# Patient Record
Sex: MALE | Race: WHITE | NOT HISPANIC OR LATINO
[De-identification: names, ages, dates, MRNs, and addresses within clinical notes are randomized per-mention and may not be internally consistent; named-entity substitution may affect disease eponyms.]

---

## 2019-07-11 ENCOUNTER — TRANSCRIPTION ENCOUNTER (OUTPATIENT)
Age: 49
End: 2019-07-11

## 2020-07-12 ENCOUNTER — EMERGENCY (EMERGENCY)
Facility: HOSPITAL | Age: 50
LOS: 1 days | Discharge: HOME | End: 2020-07-12
Attending: EMERGENCY MEDICINE
Payer: COMMERCIAL

## 2020-07-12 VITALS
HEIGHT: 72 IN | RESPIRATION RATE: 19 BRPM | TEMPERATURE: 98 F | DIASTOLIC BLOOD PRESSURE: 65 MMHG | SYSTOLIC BLOOD PRESSURE: 117 MMHG | OXYGEN SATURATION: 97 % | HEART RATE: 70 BPM | WEIGHT: 184.97 LBS

## 2020-07-12 DIAGNOSIS — Q82.5 CONGENITAL NON-NEOPLASTIC NEVUS: Chronic | ICD-10-CM

## 2020-07-12 DIAGNOSIS — Z98.89 OTHER SPECIFIED POSTPROCEDURAL STATES: Chronic | ICD-10-CM

## 2020-07-12 PROCEDURE — 99283 EMERGENCY DEPT VISIT LOW MDM: CPT | Mod: 25

## 2020-07-12 PROCEDURE — U0003: CPT

## 2020-07-12 PROCEDURE — 71045 X-RAY EXAM CHEST 1 VIEW: CPT

## 2020-07-12 PROCEDURE — 99284 EMERGENCY DEPT VISIT MOD MDM: CPT

## 2020-07-12 PROCEDURE — 71045 X-RAY EXAM CHEST 1 VIEW: CPT | Mod: 26

## 2020-07-12 NOTE — ED STATDOCS - PSH
Birthmark of skin  approx. 1978 - Excision of birthmark from navel area  S/P arthroscopy of shoulder  Oct 2013 - Left shoulder  S/P colonoscopy with polypectomy  2012

## 2020-07-12 NOTE — ED STATDOCS - PATIENT PORTAL LINK FT
You can access the FollowMyHealth Patient Portal offered by [facility] by registering at the following website: [email or web address]. By joining [employer]’s FollowMyHealth portal, you will also be able to view your health information using other applications (apps) compatible with our system.

## 2020-07-12 NOTE — ED STATDOCS - ATTENDING CONTRIBUTION TO CARE
I, Pierre Barba, performed the initial face to face bedside interview with this patient regarding history of present illness, review of symptoms and relevant past medical, social and family history.  I completed an independent physical examination.  I was the initial provider who evaluated this patient. I have signed out the follow up of any pending tests (i.e. labs, radiological studies) to the ACP.  I have communicated the patient’s plan of care and disposition with the ACP.  The history, relevant review of systems, past medical and surgical history, medical decision making, and physical examination was documented by the scribe in my presence and I attest to the accuracy of the documentation. I, Pierre Barba, performed the initial face to face bedside interview with this patient regarding history of present illness, review of symptoms and relevant past medical, social and family history.  I completed an independent physical examination.  I was the initial provider who evaluated this patient. I have signed out the follow up of any pending tests (i.e. labs, radiological studies) to the ACP.  I have communicated the patient’s plan of care and disposition with the ACP.  The history, relevant review of systems, past medical and surgical history, medical decision making, and physical examination was documented by the scribe in my presence and I attest to the accuracy of the documentation..

## 2020-07-12 NOTE — ED ADULT TRIAGE NOTE - CHIEF COMPLAINT QUOTE
Patient presented to ed secondary to sick contact with covid 19 patient with symptoms of running nose headache sore throat and dry cough started few days ago.

## 2020-07-12 NOTE — ED ADULT NURSE NOTE - NSIMPLEMENTINTERV_GEN_ALL_ED
Implemented All Universal Safety Interventions:  [place] to call system. Call bell, personal items and telephone within reach. Instruct patient to call for assistance. Room bathroom lighting operational. Non-slip footwear when patient is off stretcher. Physically safe environment: no spills, clutter or unnecessary equipment. Stretcher in lowest position, wheels locked, appropriate side rails in place.

## 2020-07-12 NOTE — ED STATDOCS - OBJECTIVE STATEMENT
51y/o M with no significant PMHx presents to the ED presents to the ED c/o mild cough with nasal congestion. Pt has contact with a friend who had parents that were tested positive for COVID. Denies SOB or CP.

## 2020-07-12 NOTE — ED ADULT NURSE NOTE - CHIEF COMPLAINT QUOTE
Patient presented to ed secondary to sick contact with covid 19 patient with symptoms of running nose headache sore throat and dry cough started few days ago. decreased strength/pain

## 2020-07-13 LAB — SARS-COV-2 RNA SPEC QL NAA+PROBE: SIGNIFICANT CHANGE UP

## 2020-12-18 NOTE — ED STATDOCS - CHPI ED RELIEVING FACTORS
[facility] calling to advise the death certificate has been faxed on 12/18 and please sign asap. nothing

## 2020-12-24 ENCOUNTER — EMERGENCY (EMERGENCY)
Facility: HOSPITAL | Age: 50
LOS: 1 days | Discharge: HOME | End: 2020-12-24
Payer: COMMERCIAL

## 2020-12-24 VITALS
DIASTOLIC BLOOD PRESSURE: 73 MMHG | OXYGEN SATURATION: 98 % | SYSTOLIC BLOOD PRESSURE: 125 MMHG | HEART RATE: 62 BPM | RESPIRATION RATE: 18 BRPM | WEIGHT: 175.05 LBS | TEMPERATURE: 98 F | HEIGHT: 72 IN

## 2020-12-24 DIAGNOSIS — Q82.5 CONGENITAL NON-NEOPLASTIC NEVUS: Chronic | ICD-10-CM

## 2020-12-24 DIAGNOSIS — Z98.89 OTHER SPECIFIED POSTPROCEDURAL STATES: Chronic | ICD-10-CM

## 2020-12-24 LAB — SARS-COV-2 RNA SPEC QL NAA+PROBE: SIGNIFICANT CHANGE UP

## 2020-12-24 PROCEDURE — 99283 EMERGENCY DEPT VISIT LOW MDM: CPT

## 2020-12-24 PROCEDURE — U0003: CPT

## 2020-12-24 NOTE — ED PROVIDER NOTE - PATIENT PORTAL LINK FT
You can access the FollowMyHealth Patient Portal offered by [facility] by registering at the following website: [email or web address]. By joining [employer]’s FollowMyHealth portal, you will also be able to view your health information using other applications (apps) compatible with our system.

## 2020-12-24 NOTE — ED PROVIDER NOTE - NSTIMEPROVIDERCAREINITIATE_GEN_ER
24-Dec-2020 12:12
I have personally seen and examined this patient.  I have fully participated in the care of this patient. I have reviewed all pertinent clinical information, including history, physical exam, plan and the Resident’s note and agree except as noted.

## 2020-12-31 ENCOUNTER — EMERGENCY (EMERGENCY)
Facility: HOSPITAL | Age: 50
LOS: 1 days | Discharge: HOME | End: 2020-12-31
Payer: COMMERCIAL

## 2020-12-31 VITALS
TEMPERATURE: 98 F | HEIGHT: 72 IN | SYSTOLIC BLOOD PRESSURE: 146 MMHG | RESPIRATION RATE: 20 BRPM | WEIGHT: 190.04 LBS | OXYGEN SATURATION: 98 % | HEART RATE: 91 BPM | DIASTOLIC BLOOD PRESSURE: 86 MMHG

## 2020-12-31 DIAGNOSIS — Q82.5 CONGENITAL NON-NEOPLASTIC NEVUS: Chronic | ICD-10-CM

## 2020-12-31 DIAGNOSIS — Z98.89 OTHER SPECIFIED POSTPROCEDURAL STATES: Chronic | ICD-10-CM

## 2020-12-31 LAB — SARS-COV-2 RNA SPEC QL NAA+PROBE: SIGNIFICANT CHANGE UP

## 2020-12-31 PROCEDURE — 99283 EMERGENCY DEPT VISIT LOW MDM: CPT

## 2020-12-31 PROCEDURE — U0003: CPT

## 2020-12-31 PROCEDURE — 99282 EMERGENCY DEPT VISIT SF MDM: CPT

## 2020-12-31 NOTE — ED PROVIDER NOTE - PATIENT PORTAL LINK FT
You can access the FollowMyHealth Patient Portal offered by [facility] by registering at the following website: [email or web address]. By joining [employer]’s FollowMyHealth portal, you will also be able to view your health information using other applications (apps) compatible with our system.

## 2020-12-31 NOTE — ED PROVIDER NOTE - PHYSICAL EXAMINATION
Constitutional - well-developed; well nourished. Head - NCAT. Airway patent. Neuro - A&Ox3. normal gait. Skin - No rash. MSK - normal ROM.

## 2021-01-04 ENCOUNTER — EMERGENCY (EMERGENCY)
Facility: HOSPITAL | Age: 51
LOS: 1 days | Discharge: HOME | End: 2021-01-04
Payer: COMMERCIAL

## 2021-01-04 VITALS
SYSTOLIC BLOOD PRESSURE: 160 MMHG | DIASTOLIC BLOOD PRESSURE: 93 MMHG | HEIGHT: 72 IN | HEART RATE: 100 BPM | RESPIRATION RATE: 16 BRPM | TEMPERATURE: 98 F | OXYGEN SATURATION: 99 %

## 2021-01-04 DIAGNOSIS — Q82.5 CONGENITAL NON-NEOPLASTIC NEVUS: Chronic | ICD-10-CM

## 2021-01-04 DIAGNOSIS — Z98.89 OTHER SPECIFIED POSTPROCEDURAL STATES: Chronic | ICD-10-CM

## 2021-01-04 LAB — SARS-COV-2 RNA SPEC QL NAA+PROBE: SIGNIFICANT CHANGE UP

## 2021-01-04 PROCEDURE — U0003: CPT

## 2021-01-04 PROCEDURE — 99283 EMERGENCY DEPT VISIT LOW MDM: CPT

## 2021-01-04 PROCEDURE — U0005: CPT

## 2021-01-04 NOTE — ED PROVIDER NOTE - PATIENT PORTAL LINK FT
You can access the FollowMyHealth Patient Portal offered by [facility] by registering at the following website: [email or web address]. By joining [employer]’s FollowMyHealth portal, you will also be able to view your health information using other applications (apps) compatible with our system.

## 2021-01-04 NOTE — ED PROVIDER NOTE - OBJECTIVE STATEMENT
49yo M presents for covid-19 testing. Pt's wife tested positive on Saturday, 3 days ago. No symptoms at this time, feeling well. Denies fevers chills, loss of taste or smell, URI symptoms, chest pain or shortness of breath, nausea vomiting diarrhea abdominal pain, weakness or fatigue. Eating and drinking normal diet. Normal output.

## 2021-01-04 NOTE — ED PROVIDER NOTE - CLINICAL SUMMARY MEDICAL DECISION MAKING FREE TEXT BOX
Pt presenting for asymptomatic swab. COVID (and/or RVP) swab(s) obtained.  Advised home quarantine until test results available.  If test positive, requires home quarantine. Anticipatory guidance provided and supportive care recommended. Advised immediate return if worsening symptoms, including and not limited to chest pain, worsening shortness of breath, fever not reduced with OTC antipyretic use, inability to tolerate food or liquid. Patient provided copy of written covid discharge instructions as well as post discharge instructions on how to obtain their covid swab results.

## 2021-01-04 NOTE — ED PROVIDER NOTE - PHYSICAL EXAMINATION
Gen: WD/WN, NAD, non-toxic  HENT: NCAT  Cardiac: Well perfused  Resp: No resp distress  Skin: Warm, dry, no rashes or lesions  Neuro: Awake, alert & oriented x 3.

## 2021-01-04 NOTE — ED PROVIDER NOTE - NS ED ROS FT
Gen: denies fever, chills, fatigue, weight loss  Skin: denies rashes, laceration, bruising  HEENT: denies visual changes, ear pain, nasal congestion, throat pain  Respiratory: denies BRUSH, SOB, cough, wheezing  Cardiovascular: denies chest pain, palpitations, diaphoresis, LE edema  GI: denies abdominal pain, n/v/d

## 2021-01-04 NOTE — ED PROVIDER NOTE - NSFOLLOWUPINSTRUCTIONS_ED_ALL_ED_FT
Patient provided copy of written covid discharge instructions as well as post discharge instructions on how to obtain their covid swab results.

## 2021-12-13 ENCOUNTER — EMERGENCY (EMERGENCY)
Facility: HOSPITAL | Age: 51
LOS: 1 days | Discharge: HOME | End: 2021-12-13
Attending: EMERGENCY MEDICINE
Payer: COMMERCIAL

## 2021-12-13 VITALS
HEIGHT: 72 IN | WEIGHT: 199.96 LBS | RESPIRATION RATE: 20 BRPM | DIASTOLIC BLOOD PRESSURE: 93 MMHG | SYSTOLIC BLOOD PRESSURE: 132 MMHG | OXYGEN SATURATION: 97 % | TEMPERATURE: 98 F | HEART RATE: 63 BPM

## 2021-12-13 DIAGNOSIS — Z98.89 OTHER SPECIFIED POSTPROCEDURAL STATES: Chronic | ICD-10-CM

## 2021-12-13 DIAGNOSIS — Q82.5 CONGENITAL NON-NEOPLASTIC NEVUS: Chronic | ICD-10-CM

## 2021-12-13 LAB
HMPV RNA SPEC QL NAA+PROBE: DETECTED
RAPID RVP RESULT: DETECTED
SARS-COV-2 RNA SPEC QL NAA+PROBE: SIGNIFICANT CHANGE UP

## 2021-12-13 PROCEDURE — 0225U NFCT DS DNA&RNA 21 SARSCOV2: CPT

## 2021-12-13 PROCEDURE — 71046 X-RAY EXAM CHEST 2 VIEWS: CPT

## 2021-12-13 PROCEDURE — 71046 X-RAY EXAM CHEST 2 VIEWS: CPT | Mod: 26

## 2021-12-13 PROCEDURE — 99284 EMERGENCY DEPT VISIT MOD MDM: CPT

## 2021-12-13 PROCEDURE — 99283 EMERGENCY DEPT VISIT LOW MDM: CPT | Mod: 25

## 2021-12-13 NOTE — ED PROVIDER NOTE - NSTIMEPROVIDERCAREINITIATE_GEN_ER
13-Dec-2021 09:23 [Time Spent: ___ minutes] : I have spent [unfilled] minutes of time on the encounter. [>50% of the face to face encounter time was spent on counseling and/or coordination of care for ___] : Greater than 50% of the face to face encounter time was spent on counseling and/or coordination of care for [unfilled]

## 2021-12-13 NOTE — ED PROVIDER NOTE - OBJECTIVE STATEMENT
50yo male with no PMH presenting with nonproductive cough  and body aches x 2 days, no fevers, chills, n/v/d. Patient received covid vaccine, was scheduled for booster today but did not go because he was having symptoms. Denies sick contacts.

## 2021-12-13 NOTE — ED PROVIDER NOTE - ATTENDING CONTRIBUTION TO CARE
I, Gena Olivares, performed a face to face bedside interview with this patient regarding history of present illness, review of symptoms and relevant past medical, social and family history.  I completed an independent physical examination. Medical decision making, follow-up on ordered tests (ie labs, radiologic studies) and re-evaluation of the patient's status has been communicated to the ACP.  Disposition of the patient will be based on test outcome and response to ED interventions.

## 2021-12-13 NOTE — ED PROVIDER NOTE - PHYSICAL EXAMINATION
Gen: NAD, AOx3  Head: NCAT  HEENT: oral mucosa moist, normal conjunctiva  Lung: CTAB, no respiratory distress, no wheezing, rales, rhonchi  CV: normal s1/s2, rrr, no murmurs, Normal perfusion  Abd: soft, NTND  MSK: No edema, no visible deformities, full range of motion in all 4 extremities  Neuro: No focal neurologic deficits  Skin: No rash   Psych: normal affect

## 2021-12-13 NOTE — ED PROVIDER NOTE - NSICDXPASTSURGICALHX_GEN_ALL_CORE_FT
PAST SURGICAL HISTORY:  Birthmark of skin approx. 1978 - Excision of birthmark from navel area    S/P arthroscopy of shoulder Oct 2013 - Left shoulder    S/P colonoscopy with polypectomy 2012

## 2021-12-13 NOTE — ED ADULT NURSE NOTE - NSIMPLEMENTINTERV_GEN_ALL_ED
Implemented All Universal Safety Interventions:  [place] to call system. Call bell, personal items and telephone within reach. Instruct patient to call for assistance. Room bathroom lighting operational. Non-slip footwear when patient is off stretcher. Physically safe environment: no spills, clutter or unnecessary equipment. Stretcher in lowest position, wheels locked, appropriate side rails in place.

## 2023-02-13 ENCOUNTER — OFFICE (OUTPATIENT)
Dept: URBAN - METROPOLITAN AREA CLINIC 12 | Facility: CLINIC | Age: 53
Setting detail: OPHTHALMOLOGY
End: 2023-02-13
Payer: COMMERCIAL

## 2023-02-13 DIAGNOSIS — H43.391: ICD-10-CM

## 2023-02-13 DIAGNOSIS — H52.4: ICD-10-CM

## 2023-02-13 PROCEDURE — 92014 COMPRE OPH EXAM EST PT 1/>: CPT | Performed by: OPHTHALMOLOGY

## 2023-02-13 PROCEDURE — 92015 DETERMINE REFRACTIVE STATE: CPT | Performed by: OPHTHALMOLOGY

## 2023-02-13 ASSESSMENT — VISUAL ACUITY
OS_BCVA: 20/25
OD_BCVA: 20/30

## 2023-02-13 ASSESSMENT — REFRACTION_MANIFEST
OD_CYLINDER: -0.75
OS_VA1: 20/20
OD_CYLINDER: SPHERE
OS_ADD: +1.50
OD_VA1: 20/20
OS_CYLINDER: SPHERE
OD_ADD: +1.50
OS_AXIS: 175
OS_SPHERE: +1.50
OD_SPHERE: +0.25
OS_CYLINDER: -1.25
OD_AXIS: 10
OD_SPHERE: +2.00
OS_SPHERE: +1.50

## 2023-02-13 ASSESSMENT — KERATOMETRY
OS_K1POWER_DIOPTERS: 44.50
OD_K1POWER_DIOPTERS: 42.50
OS_K2POWER_DIOPTERS: 45.00
OD_K2POWER_DIOPTERS: 42.75
OS_AXISANGLE_DEGREES: 028
OD_AXISANGLE_DEGREES: 050

## 2023-02-13 ASSESSMENT — CONFRONTATIONAL VISUAL FIELD TEST (CVF)
OS_FINDINGS: FULL
OD_FINDINGS: FULL

## 2023-02-13 ASSESSMENT — SPHEQUIV_DERIVED
OS_SPHEQUIV: -0.5
OS_SPHEQUIV: 0.875
OD_SPHEQUIV: -0.125
OD_SPHEQUIV: 0.875

## 2023-02-13 ASSESSMENT — REFRACTION_CURRENTRX
OD_SPHERE: +0.25
OS_AXIS: 168
OD_CYLINDER: -1.00
OD_ADD: +1.00
OS_CYLINDER: -1.25
OD_AXIS: 19
OS_SPHERE: +0.25
OD_OVR_VA: 20/
OS_VPRISM_DIRECTION: PROGS
OS_ADD: +1.00
OD_VPRISM_DIRECTION: PROGS
OS_OVR_VA: 20/

## 2023-02-13 ASSESSMENT — AXIALLENGTH_DERIVED
OD_AL: 23.9679
OS_AL: 23.3305
OD_AL: 23.5724
OS_AL: 22.8181

## 2023-02-13 ASSESSMENT — REFRACTION_AUTOREFRACTION
OD_AXIS: 036
OS_SPHERE: -0.25
OS_CYLINDER: -0.50
OD_CYLINDER: -0.25
OS_AXIS: 158
OD_SPHERE: +1.00

## 2023-02-13 ASSESSMENT — TONOMETRY
OS_IOP_MMHG: 15
OD_IOP_MMHG: 10

## 2023-05-05 ENCOUNTER — EMERGENCY (EMERGENCY)
Facility: HOSPITAL | Age: 53
LOS: 1 days | Discharge: HOME | End: 2023-05-05
Attending: EMERGENCY MEDICINE | Admitting: STUDENT IN AN ORGANIZED HEALTH CARE EDUCATION/TRAINING PROGRAM
Payer: COMMERCIAL

## 2023-05-05 VITALS
DIASTOLIC BLOOD PRESSURE: 80 MMHG | TEMPERATURE: 98 F | SYSTOLIC BLOOD PRESSURE: 126 MMHG | HEART RATE: 64 BPM | RESPIRATION RATE: 13 BRPM | OXYGEN SATURATION: 96 %

## 2023-05-05 VITALS
OXYGEN SATURATION: 96 % | TEMPERATURE: 97 F | SYSTOLIC BLOOD PRESSURE: 143 MMHG | RESPIRATION RATE: 20 BRPM | DIASTOLIC BLOOD PRESSURE: 93 MMHG | HEART RATE: 97 BPM

## 2023-05-05 DIAGNOSIS — Q82.5 CONGENITAL NON-NEOPLASTIC NEVUS: Chronic | ICD-10-CM

## 2023-05-05 DIAGNOSIS — I48.91 UNSPECIFIED ATRIAL FIBRILLATION: ICD-10-CM

## 2023-05-05 DIAGNOSIS — Z98.89 OTHER SPECIFIED POSTPROCEDURAL STATES: Chronic | ICD-10-CM

## 2023-05-05 LAB
A1C WITH ESTIMATED AVERAGE GLUCOSE RESULT: 5.4 % — SIGNIFICANT CHANGE UP (ref 4–5.6)
ALBUMIN SERPL ELPH-MCNC: 4.4 G/DL — SIGNIFICANT CHANGE UP (ref 3.3–5.2)
ALP SERPL-CCNC: 73 U/L — SIGNIFICANT CHANGE UP (ref 40–120)
ALT FLD-CCNC: 31 U/L — SIGNIFICANT CHANGE UP
ANION GAP SERPL CALC-SCNC: 13 MMOL/L — SIGNIFICANT CHANGE UP (ref 5–17)
APTT BLD: 26.4 SEC — LOW (ref 27.5–35.5)
AST SERPL-CCNC: 26 U/L — SIGNIFICANT CHANGE UP
BASOPHILS # BLD AUTO: 0.07 K/UL — SIGNIFICANT CHANGE UP (ref 0–0.2)
BASOPHILS NFR BLD AUTO: 0.9 % — SIGNIFICANT CHANGE UP (ref 0–2)
BILIRUB SERPL-MCNC: <0.2 MG/DL — LOW (ref 0.4–2)
BUN SERPL-MCNC: 24.2 MG/DL — HIGH (ref 8–20)
CALCIUM SERPL-MCNC: 9.2 MG/DL — SIGNIFICANT CHANGE UP (ref 8.4–10.5)
CHLORIDE SERPL-SCNC: 104 MMOL/L — SIGNIFICANT CHANGE UP (ref 96–108)
CHOLEST SERPL-MCNC: 197 MG/DL — SIGNIFICANT CHANGE UP
CO2 SERPL-SCNC: 23 MMOL/L — SIGNIFICANT CHANGE UP (ref 22–29)
CREAT SERPL-MCNC: 0.71 MG/DL — SIGNIFICANT CHANGE UP (ref 0.5–1.3)
D DIMER BLD IA.RAPID-MCNC: <150 NG/ML DDU — SIGNIFICANT CHANGE UP
EGFR: 108 ML/MIN/1.73M2 — SIGNIFICANT CHANGE UP
EOSINOPHIL # BLD AUTO: 0.44 K/UL — SIGNIFICANT CHANGE UP (ref 0–0.5)
EOSINOPHIL NFR BLD AUTO: 5.4 % — SIGNIFICANT CHANGE UP (ref 0–6)
ESTIMATED AVERAGE GLUCOSE: 108 MG/DL — SIGNIFICANT CHANGE UP (ref 68–114)
GLUCOSE SERPL-MCNC: 97 MG/DL — SIGNIFICANT CHANGE UP (ref 70–99)
HCT VFR BLD CALC: 43.8 % — SIGNIFICANT CHANGE UP (ref 39–50)
HDLC SERPL-MCNC: 64 MG/DL — SIGNIFICANT CHANGE UP
HGB BLD-MCNC: 14.7 G/DL — SIGNIFICANT CHANGE UP (ref 13–17)
IMM GRANULOCYTES NFR BLD AUTO: 1.1 % — HIGH (ref 0–0.9)
INR BLD: 0.91 RATIO — SIGNIFICANT CHANGE UP (ref 0.88–1.16)
LIPID PNL WITH DIRECT LDL SERPL: 120 MG/DL — HIGH
LYMPHOCYTES # BLD AUTO: 2.81 K/UL — SIGNIFICANT CHANGE UP (ref 1–3.3)
LYMPHOCYTES # BLD AUTO: 34.2 % — SIGNIFICANT CHANGE UP (ref 13–44)
MAGNESIUM SERPL-MCNC: 2.3 MG/DL — SIGNIFICANT CHANGE UP (ref 1.6–2.6)
MCHC RBC-ENTMCNC: 27 PG — SIGNIFICANT CHANGE UP (ref 27–34)
MCHC RBC-ENTMCNC: 33.6 GM/DL — SIGNIFICANT CHANGE UP (ref 32–36)
MCV RBC AUTO: 80.5 FL — SIGNIFICANT CHANGE UP (ref 80–100)
MONOCYTES # BLD AUTO: 0.7 K/UL — SIGNIFICANT CHANGE UP (ref 0–0.9)
MONOCYTES NFR BLD AUTO: 8.5 % — SIGNIFICANT CHANGE UP (ref 2–14)
NEUTROPHILS # BLD AUTO: 4.1 K/UL — SIGNIFICANT CHANGE UP (ref 1.8–7.4)
NEUTROPHILS NFR BLD AUTO: 49.9 % — SIGNIFICANT CHANGE UP (ref 43–77)
NON HDL CHOLESTEROL: 133 MG/DL — HIGH
NT-PROBNP SERPL-SCNC: 54 PG/ML — SIGNIFICANT CHANGE UP (ref 0–300)
PLATELET # BLD AUTO: 284 K/UL — SIGNIFICANT CHANGE UP (ref 150–400)
POTASSIUM SERPL-MCNC: 4.2 MMOL/L — SIGNIFICANT CHANGE UP (ref 3.5–5.3)
POTASSIUM SERPL-SCNC: 4.2 MMOL/L — SIGNIFICANT CHANGE UP (ref 3.5–5.3)
PROT SERPL-MCNC: 6.7 G/DL — SIGNIFICANT CHANGE UP (ref 6.6–8.7)
PROTHROM AB SERPL-ACNC: 10.6 SEC — SIGNIFICANT CHANGE UP (ref 10.5–13.4)
RBC # BLD: 5.44 M/UL — SIGNIFICANT CHANGE UP (ref 4.2–5.8)
RBC # FLD: 14.1 % — SIGNIFICANT CHANGE UP (ref 10.3–14.5)
SODIUM SERPL-SCNC: 140 MMOL/L — SIGNIFICANT CHANGE UP (ref 135–145)
T4 AB SER-ACNC: 5.8 UG/DL — SIGNIFICANT CHANGE UP (ref 4.5–12)
TRIGL SERPL-MCNC: 64 MG/DL — SIGNIFICANT CHANGE UP
TROPONIN T SERPL-MCNC: <0.01 NG/ML — SIGNIFICANT CHANGE UP (ref 0–0.06)
TROPONIN T SERPL-MCNC: <0.01 NG/ML — SIGNIFICANT CHANGE UP (ref 0–0.06)
TSH SERPL-MCNC: 4.5 UIU/ML — HIGH (ref 0.27–4.2)
WBC # BLD: 8.21 K/UL — SIGNIFICANT CHANGE UP (ref 3.8–10.5)
WBC # FLD AUTO: 8.21 K/UL — SIGNIFICANT CHANGE UP (ref 3.8–10.5)

## 2023-05-05 PROCEDURE — 84436 ASSAY OF TOTAL THYROXINE: CPT

## 2023-05-05 PROCEDURE — 99285 EMERGENCY DEPT VISIT HI MDM: CPT | Mod: 25

## 2023-05-05 PROCEDURE — 93010 ELECTROCARDIOGRAM REPORT: CPT | Mod: 76

## 2023-05-05 PROCEDURE — 84484 ASSAY OF TROPONIN QUANT: CPT

## 2023-05-05 PROCEDURE — 99222 1ST HOSP IP/OBS MODERATE 55: CPT

## 2023-05-05 PROCEDURE — 96374 THER/PROPH/DIAG INJ IV PUSH: CPT | Mod: XU

## 2023-05-05 PROCEDURE — 93005 ELECTROCARDIOGRAM TRACING: CPT

## 2023-05-05 PROCEDURE — G0378: CPT

## 2023-05-05 PROCEDURE — 85025 COMPLETE CBC W/AUTO DIFF WBC: CPT

## 2023-05-05 PROCEDURE — 99236 HOSP IP/OBS SAME DATE HI 85: CPT

## 2023-05-05 PROCEDURE — 71045 X-RAY EXAM CHEST 1 VIEW: CPT | Mod: 26

## 2023-05-05 PROCEDURE — 85379 FIBRIN DEGRADATION QUANT: CPT

## 2023-05-05 PROCEDURE — 80061 LIPID PANEL: CPT

## 2023-05-05 PROCEDURE — 85730 THROMBOPLASTIN TIME PARTIAL: CPT

## 2023-05-05 PROCEDURE — 85610 PROTHROMBIN TIME: CPT

## 2023-05-05 PROCEDURE — 83880 ASSAY OF NATRIURETIC PEPTIDE: CPT

## 2023-05-05 PROCEDURE — 83036 HEMOGLOBIN GLYCOSYLATED A1C: CPT

## 2023-05-05 PROCEDURE — C8929: CPT

## 2023-05-05 PROCEDURE — 80053 COMPREHEN METABOLIC PANEL: CPT

## 2023-05-05 PROCEDURE — 83735 ASSAY OF MAGNESIUM: CPT

## 2023-05-05 PROCEDURE — 71045 X-RAY EXAM CHEST 1 VIEW: CPT

## 2023-05-05 PROCEDURE — 99284 EMERGENCY DEPT VISIT MOD MDM: CPT

## 2023-05-05 PROCEDURE — 84443 ASSAY THYROID STIM HORMONE: CPT

## 2023-05-05 PROCEDURE — 36415 COLL VENOUS BLD VENIPUNCTURE: CPT

## 2023-05-05 RX ORDER — METOPROLOL TARTRATE 50 MG
25 TABLET ORAL ONCE
Refills: 0 | Status: COMPLETED | OUTPATIENT
Start: 2023-05-05 | End: 2023-05-05

## 2023-05-05 RX ORDER — FLECAINIDE ACETATE 50 MG
2 TABLET ORAL
Qty: 30 | Refills: 0
Start: 2023-05-05 | End: 2023-05-19

## 2023-05-05 RX ORDER — FLECAINIDE ACETATE 50 MG
300 TABLET ORAL ONCE
Refills: 0 | Status: COMPLETED | OUTPATIENT
Start: 2023-05-05 | End: 2023-05-05

## 2023-05-05 RX ORDER — METOPROLOL TARTRATE 50 MG
25 TABLET ORAL EVERY 8 HOURS
Refills: 0 | Status: DISCONTINUED | OUTPATIENT
Start: 2023-05-05 | End: 2023-05-12

## 2023-05-05 RX ORDER — ENOXAPARIN SODIUM 100 MG/ML
95 INJECTION SUBCUTANEOUS ONCE
Refills: 0 | Status: COMPLETED | OUTPATIENT
Start: 2023-05-05 | End: 2023-05-05

## 2023-05-05 RX ORDER — SODIUM CHLORIDE 9 MG/ML
1000 INJECTION INTRAMUSCULAR; INTRAVENOUS; SUBCUTANEOUS ONCE
Refills: 0 | Status: COMPLETED | OUTPATIENT
Start: 2023-05-05 | End: 2023-05-05

## 2023-05-05 RX ORDER — METOPROLOL TARTRATE 50 MG
5 TABLET ORAL ONCE
Refills: 0 | Status: COMPLETED | OUTPATIENT
Start: 2023-05-05 | End: 2023-05-05

## 2023-05-05 RX ORDER — ENOXAPARIN SODIUM 100 MG/ML
80 INJECTION SUBCUTANEOUS ONCE
Refills: 0 | Status: DISCONTINUED | OUTPATIENT
Start: 2023-05-05 | End: 2023-05-05

## 2023-05-05 RX ORDER — APIXABAN 2.5 MG/1
1 TABLET, FILM COATED ORAL
Qty: 60 | Refills: 0
Start: 2023-05-05 | End: 2023-06-03

## 2023-05-05 RX ORDER — METOPROLOL TARTRATE 50 MG
1 TABLET ORAL
Qty: 30 | Refills: 0
Start: 2023-05-05 | End: 2023-06-03

## 2023-05-05 RX ADMIN — Medication 25 MILLIGRAM(S): at 04:48

## 2023-05-05 RX ADMIN — Medication 5 MILLIGRAM(S): at 04:06

## 2023-05-05 RX ADMIN — Medication 25 MILLIGRAM(S): at 13:14

## 2023-05-05 RX ADMIN — ENOXAPARIN SODIUM 95 MILLIGRAM(S): 100 INJECTION SUBCUTANEOUS at 11:04

## 2023-05-05 RX ADMIN — Medication 300 MILLIGRAM(S): at 13:15

## 2023-05-05 RX ADMIN — SODIUM CHLORIDE 1000 MILLILITER(S): 9 INJECTION INTRAMUSCULAR; INTRAVENOUS; SUBCUTANEOUS at 02:58

## 2023-05-05 NOTE — ED CDU PROVIDER INITIAL DAY NOTE - RATE
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  Briefly, he is a 10-year-old male with a history of mild developmental delay, morbid obesity with a BMI of 40 and asthma on medications.  He was brought to medical attention recently because of his prolonged history of headaches and has anywhere from 2-3 to 15 a day.  These headaches can be short-lived, lasting seconds to minutes, or the entirety of the day.  These headaches can be precipitated and are exacerbated by episodes of coughing or sneezing.  He has taken over-the-counter medications including ibuprofen, Tylenol and a nonspecified migraine medication, which have all provided no relief for him.  His mother who is present in the exam room with him elaborates saying that he has had these headaches since he was approximately 2 years old.  Other symptoms that they have noticed he has had no difficulties with swallowing, but does have difficulties snoring and has an artificial diagnosis of sleep apnea that was made several years ago; however, he apparently does not qualify for a CPAP machine.  He also has had a longstanding history of complaints of bilateral shoulder and arm pain that are almost daily and are not precipitated by activity and seem to emanate from his neck.  He also has a longstanding history of clumsiness and numbness and tingling in his bilateral hands and feet.  He has an MRI that was performed at an outside hospital and he is here to discuss those results.      PAST MEDICAL HISTORY:     1.  Asthma.   2.  Seasonal allergies.   3.  Morbid obesity.    4.  Suspected sleep apnea.      MEDICATIONS:     1.  Beclomethasone 1 puff b.i.d.   2.  Albuterol 2 puffs 4-6 hours p.r.n.   3.  Zyrtec 10 mL daily p.r.n.    
  ALLERGIES:  Mosquito.      SOCIAL HISTORY:  He lives with his mother and a 6-year-old sister.  He is home schooled.      SURGICAL HISTORY:  He has had his tonsils and adenoids removed in 11/2010.      FAMILY HISTORY:  Maternal history of anemia of unspecified origin.      PHYSICAL EXAMINATION:  This is a large 10-year-old boy seated in the exam room in no acute distress.  He is alert and oriented.  He has some mild cognitive delay.  His pupils are bilaterally reactive.  His extraocular movements are intact.  His strength is 5/5 in all 4 extremities.  Sensation is intact to light touch.  No pathologic reflexes such as Parikh, clonus, or Babinski were elicited.  The remainder of his deep tendon reflexes were 2+.  His tongue protrusion was midline.  His gait was mildly clumsy.  He has a positive gag reflux.      IMAGING:  We have an MRI dated October from outside hospital, which demonstrates basilar invagination, as well as a Chiari I malformation.  He also seems to have a small posterior fossa arachnoid cyst.      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      PLAN:  We discussed these radiographic findings as well as their likely underlying etiology of his host of symptoms including his possible sleep apnea, shoulder pain, clumsiness, bilateral numbness as well as headaches.  A variety of surgical options were discussed with the patient as well as his family including an odontoidectomy and occipital cervical fusion as well as Chiari decompression.  The natural history of basilar invagination and Chiari I malformation were also discussed.  Risks and benefits as well as lack of medical alternatives were also presented.  The patient and parents were interested in pursuing a more conservative approach to surgical intervention, 
meaning possible bone only Chiari decompression surgery.  The risks and benefits of such a procedure were discussed.  Questions were elicited and answered.  The patient and his mother are ready to move forward with scheduling this surgery.  Prior to that we have requested that we obtain records from his [facility].  We also need copies of his spinal MRI of the full spine without contrast that was obtained at an outside hospital.   Dictated by Shashank Clayton MD, Resident.         CHIDI SCHNEIDER MD       As dictated by SHASHANK CLAYTON MD            D: 2018   T: 2018   MT: LG      Name:     ANGELICA ISRAEL   MRN:      [id number]        Account:      [id number]   :      2008           Service Date: 2018      Document: [id number]      I, Chidi Schneider MD, saw and evaluated Angelica Israel as part of a shared visit.  I have reviewed and discussed with the resident their history, physical and plan.    I personally reviewed the vital signs, medications, labs and imaging .    My key history or physical exam findings: multiple symptoms as described, with basilar invagination and chiari I malformation.     Key management decisions made by me: Discussed in detail the problem and reviewed imaging. We discussed the options that include further observation, ventral decompression and fusion and posterior decompression. Family would wish to proceed with posterior chiari decompression and we discussed how this may help to alleviate some or possibly all of his symptoms but it may not work as it does not address the ventral compression. However, it is possible that it would work and if it does would alleviate the need for large operation and OC fusion. Family understands risks including possible future need for ventral decompression.     Chidi Schneider MD  2018          
101
24

## 2023-05-05 NOTE — CONSULT NOTE ADULT - ASSESSMENT
This is a healthy 54 y/o man with no prior PMH who presents with symptomatic AF with RVR starting at 8 pm 5/4/23. Since he has been in the ED, he was given metoprolol 5 mg IV then 25 mg PO and rates are well controlled now but he remains symptomatic. He has been NPO. No clear trigger for his AF identified. Discussed rate and rhythm control and AC for stroke prevention. His CHADS-VASc is most likely 0 and his episode started recently. Will plan Full AC for one month and cardioversion today. Will attempt chemical cardioversion with high dose flecainide (pill in the pocket) if TTE is normal. If this did not work in 3-4 hours can perform external DCCV and then can go home on BB/AC and f/u as o/p.   Above d/w pt/wife/ED and cardiology. Keep NPO for now.   This is a healthy 52 y/o man with no prior PMH who presents with symptomatic AF with RVR starting at 8 pm 5/4/23. Since he has been in the ED, he was given metoprolol 5 mg IV then 25 mg PO and rates are well controlled now but he remains symptomatic. He has been NPO. No clear trigger for his AF identified. Discussed rate and rhythm control and AC for stroke prevention. His CHADS-VASc is most likely 0 and his episode started recently. Will plan Full AC for one month and cardioversion today. Will attempt chemical cardioversion with high dose flecainide (pill in the pocket) if TTE is normal. If this did not work in 3-4 hours can perform external DCCV and then can go home on BB/AC and f/u as o/p.   Above d/w pt/wife/ED and cardiology. Keep NPO for now. Also pt with overweight/obesity and counseled to lose some weight.

## 2023-05-05 NOTE — ED ADULT NURSE NOTE - OBJECTIVE STATEMENT
pt to ED with complaints of palpitations. denies pmh. pt states he just returned home from vacation. pt was sitting on the couch tonight approx 2 hours PTA when he noted fluttering in his chest. pt states it felt like his heart was racing. pt denies any pain, SOB, LE swelling. pt states he has felt this feeling in the past but it subsided quickly. pt placed on CM, in afib. RR even and unlabored.

## 2023-05-05 NOTE — ED ADULT NURSE REASSESSMENT NOTE - NS ED NURSE REASSESS COMMENT FT1
Assumed care at 0745. patient resting well in bed. No complaints of pain or discomfort. Patient awaiting TTE Echo to be done. Nsg will continue to monitor.

## 2023-05-05 NOTE — ED CDU PROVIDER DISPOSITION NOTE - CLINICAL COURSE
Patient with no PMH presented in A fib, medically stabilized, converted with flecainide, started on AC, will follow up outpatient with EP for MCOT and CT angio.

## 2023-05-05 NOTE — ED CDU PROVIDER DISPOSITION NOTE - PROVIDER TOKENS
PROVIDER:[TOKEN:[[id number]],FOLLOWUP:[Routine],ESTABLISHEDPATIENT:[T]],PROVIDER:[TOKEN:[[id number]],FOLLOWUP:[7-10 Days],ESTABLISHEDPATIENT:[T]]

## 2023-05-05 NOTE — ED CDU PROVIDER INITIAL DAY NOTE - DISCUSSED CASE WITH MULTISELECT OPTIONS
ID bands checked. Infant's ID band and Mother's matching ID bands removed and taped to footprint sheet, the mother verified as correct and witnessed by RN. Umbilical clamp and security puck removed. Infant placed in car seat by parent/guardian. Discharge teaching complete, discharge instructions signed, & parent/guardian denies questions regarding infant care at time of discharge. Parents verbalized understanding to follow-up with the pediatrician as recommended on the discharge instructions. Discharged in stable condition per wheel chair in mother's arms.
Consultant

## 2023-05-05 NOTE — CONSULT NOTE ADULT - NS ATTEND BILL GEN_ALL_CORE
Bedside shift change report given LULY Esparza (oncoming nurse) by Kriss Benitez and LULY Jacome (offgoing nurse). Report included the following information SBAR, Kardex, MAR and Recent Results. Attending to bill

## 2023-05-05 NOTE — ED ADULT NURSE NOTE - CAS DISCH TRANSFER METHOD
Prescription approved per [facility] Refill Protocol.  Vern Conley RN     Private car

## 2023-05-05 NOTE — ED PROVIDER NOTE - CLINICAL SUMMARY MEDICAL DECISION MAKING FREE TEXT BOX
53y M w/ no significant PMH presents for palpitations. Found to be in new onset Afib. Rate controlled at this time. Hemodynamically stable. No acute findings on initial workup. Cardiology consulted. Pt with CHADSVASC of 0. Will place in observation pending TTE.

## 2023-05-05 NOTE — ED CDU PROVIDER DISPOSITION NOTE - NSFOLLOWUPINSTRUCTIONS_ED_ALL_ED_FT
Pt s/p wound debridement for surgery complications.
Pt s/p wound debridement.
Pt s/p wound debridement. Pt had wound vac changed 12/18 by physical therapy.
s/p panniculectomy w/ removal of seroma cavity
Prabhakar: [phone]    Atrial Fibrillation    Atrial fibrillation is a type of heartbeat that is irregular or fast. If you have this condition, your heart beats without any order. This makes it hard for your heart to pump blood in a normal way.    Atrial fibrillation may come and go, or it may become a long-lasting problem. If this condition is not treated, it can put you at higher risk for stroke, heart failure, and other heart problems.    What are the causes?  This condition may be caused by diseases that damage the heart. They include:  High blood pressure.  Heart failure.  Heart valve disease.  Heart surgery.  Other causes include:  Diabetes.  Thyroid disease.  Being overweight.  Kidney disease.  Sometimes the cause is not known.    What increases the risk?  You are more likely to develop this condition if:  You are older.  You smoke.  You exercise often and very hard.  You have a family history of this condition.  You are a man.  You use drugs.  You drink a lot of alcohol.  You have lung conditions, such as emphysema, pneumonia, or COPD.  You have sleep apnea.  What are the signs or symptoms?  Common symptoms of this condition include:  A feeling that your heart is beating very fast.  Chest pain or discomfort.  Feeling short of breath.  Suddenly feeling light-headed or weak.  Getting tired easily during activity.  Fainting.  Sweating.  In some cases, there are no symptoms.    How is this treated?  Treatment for this condition depends on underlying conditions and how you feel when you have atrial fibrillation. They include:  Medicines to:  Prevent blood clots.  Treat heart rate or heart rhythm problems.  Using devices, such as a pacemaker, to correct heart rhythm problems.  Doing surgery to remove the part of the heart that sends bad signals.  Closing an area where clots can form in the heart (left atrial appendage).  In some cases, your doctor will treat other underlying conditions.    Follow these instructions at home:  Medicines    Take over-the-counter and prescription medicines only as told by your doctor.  Do not take any new medicines without first talking to your doctor.  If you are taking blood thinners:  Talk with your doctor before you take any medicines that have aspirin or NSAIDs, such as ibuprofen, in them.  Take your medicine exactly as told by your doctor. Take it at the same time each day.  Avoid activities that could hurt or bruise you. Follow instructions about how to prevent falls.  Wear a bracelet that says you are taking blood thinners. Or, carry a card that lists what medicines you take.  Lifestyle        Do not use any products that have nicotine or tobacco in them. These include cigarettes, e-cigarettes, and chewing tobacco. If you need help quitting, ask your doctor.  Eat heart-healthy foods. Talk with your doctor about the right eating plan for you.  Exercise regularly as told by your doctor.  Do not drink alcohol.  Lose weight if you are overweight.  Do not use drugs, including cannabis.  General instructions    If you have a condition that causes breathing to stop for a short period of time (apnea), treat it as told by your doctor.  Keep a healthy weight. Do not use diet pills unless your doctor says they are safe for you. Diet pills may make heart problems worse.  Keep all follow-up visits as told by your doctor. This is important.  Contact a doctor if:  You notice a change in the speed, rhythm, or strength of your heartbeat.  You are taking a blood-thinning medicine and you get more bruising.  You get tired more easily when you move or exercise.  You have a sudden change in weight.  Get help right away if:    You have pain in your chest or your belly (abdomen).  You have trouble breathing.  You have side effects of blood thinners, such as blood in your vomit, poop (stool), or pee (urine), or bleeding that cannot stop.  You have any signs of a stroke. "BE FAST" is an easy way to remember the main warning signs:  B - Balance. Signs are dizziness, sudden trouble walking, or loss of balance.  E - Eyes. Signs are trouble seeing or a change in how you see.  F - Face. Signs are sudden weakness or loss of feeling in the face, or the face or eyelid drooping on one side.  A - Arms. Signs are weakness or loss of feeling in an arm. This happens suddenly and usually on one side of the body.  S - Speech. Signs are sudden trouble speaking, slurred speech, or trouble understanding what people say.  T - Time. Time to call emergency services. Write down what time symptoms started.  You have other signs of a stroke, such as:  A sudden, very bad headache with no known cause.  Feeling like you may vomit (nausea).  Vomiting.  A seizure.  These symptoms may be an emergency. Do not wait to see if the symptoms will go away. Get medical help right away. Call your local emergency services (911 in the [place] Do not drive yourself to the hospital.    Summary  Atrial fibrillation is a type of heartbeat that is irregular or fast.  You are at higher risk of this condition if you smoke, are older, have diabetes, or are overweight.  Follow your doctor's instructions about medicines, diet, exercise, and follow-up visits.  Get help right away if you have signs or symptoms of a stroke.  Get help right away if you cannot catch your breath, or you have chest pain or discomfort.  This information is not intended to replace advice given to you by your health care provider. Make sure you discuss any questions you have with your health care provider.

## 2023-05-05 NOTE — CONSULT NOTE ADULT - NS ATTEND AMEND GEN_ALL_CORE FT
Patient was seen and examined at bedside and notes to be still feeling uncomfortable   Presented with Afib which patient states started at 8 pm last night and still in AFib and rate controlled   At baseline no chest pain or shortness of breath at rest or upon exertion   EKG reviewed Afib   Telemetry: Afib 94 bpm   Trops neg x 2   CXR no evidence of acute cardiopulmonary disease   HDL: 64     52yo M w/ no significant PMH, presents to [facility] EDU after developing palpitations last night, with new onset AFib CHADS-VASC 0   - patient notes to be in Afib rate controlled, symptoms started at 8 pm last night and still in Afib   - Telemetry AF with HR 90s bpm   - pending TTE to assess LV function and valvulopathy   - EP eval for possible antiarrythmic (Flecainide) and if no conversion with antiarrythmic will then plan for SANDRO/DCCV   - continue with Lopressor 25mg po q 8hrs   - will recommend to start Lovenox 1mg/kg q 12hrs and then transition to NOAC prior to discharge       Rashida Martino D.O. [facility]  Cardiology/Vascular Cardiology -[facility] Cardiology   Telephone # [phone]

## 2023-05-05 NOTE — ED PROVIDER NOTE - OBJECTIVE STATEMENT
53y M w/ no significant PMH, presents for palpitations. Pt reports sudden onset tonight of symptoms; says he has previously had similar episodes which never lasted this long. No chest pain or SOB. Pt just returned home from [place]. No leg pain or swelling. Denies drug or alcohol use.

## 2023-05-05 NOTE — ED CDU PROVIDER DISPOSITION NOTE - PATIENT PORTAL LINK FT
You can access the FollowMyHealth Patient Portal offered by [facility] by registering at the following website: [email or web address]. By joining [employer]’s FollowMyHealth portal, you will also be able to view your health information using other applications (apps) compatible with our system.
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
## 2023-05-05 NOTE — CHART NOTE - NSCHARTNOTEFT_GEN_A_CORE
TTE with presrved EF and mild RWMA. No CP, ST/T changes, and Tn negative. Converted with flecainide 300 mg to SR. Post conversion EKG 3h post flecainide with acceptable QR/QTc. Counseled about AF, use of AC, and pill in the pocket approach. Discussed with ED and Dr. Martino. Patient can go home on eliquis 5 mg bid for at least one month, PLUS toprol 50 mg daily and PRN Flecainide 300 mg for recurrent symptomatic AF. Will put orders in o/p EMR for MCT and cardiac CT and to f/u with me in one months or sooner if needed.

## 2023-05-05 NOTE — CONSULT NOTE ADULT - ASSESSMENT
52yo M w/ no significant PMH, presents to [facility] after developing palpitations last night.  Patient reports he was watching TV and suddenly started feeling "my heart racing" and didn't slow down.  States he had similar episodes in the past, which never lasted this long.  This time he felt palpitations until arrived to [facility] and relaxed.  Denies chest pain, dyspnea, fever, chills, abdominal pain, sick contacts or illness.  Just returned home from [place].  Denies drug or alcohol use.  Father w/ CAD.  ECG: new onset A fib w/ RVR  CEx1 negative

## 2023-05-05 NOTE — ED PROVIDER NOTE - NS ED ROS FT
Constitutional: no fever, no chills  Eyes: no vision changes  ENT: no nasal congestion, no sore throat  CV: no chest pain, +palpitations  Resp: no cough, no shortness of breath  GI: no abdominal pain, no vomiting, no diarrhea  : no dysuria  MSK: no joint pain  Skin: no rash  Neuro: no headache, no focal weakness, no paresthesias

## 2023-05-05 NOTE — CONSULT NOTE ADULT - PROBLEM SELECTOR RECOMMENDATION 9
Monitor on Tele in CDU, check CBC, BMP, TFTs, trend CEx3, ECG c/w a-fib patient asymptomatic   - [id number]= 0  (no AC indicated at this time)  - start AV latisha blocker (Lopressor 25mg oral 2x daily) on Tele monitoring  - awaiting TTE in AM  - physical activity and higher cardiorespiratory fitness may protect against mortality in AF  - might require EP evaluation for long term follow up    case d/w Dr. Martino Monitor on Tele in CDU, check CBC, BMP, TFTs, trend CEx3, ECG c/w a-fib patient asymptomatic   - [id number]= 0  (no AC indicated at this time)  - start AV latisha blocker (Lopressor 25mg oral 2x daily) on Tele monitoring  - awaiting TTE in AM and might require SANDRO/DCCV based on clinical status  - will give Lovenox 80mg SQ x1 now  - physical activity and higher cardiorespiratory fitness may protect against mortality in AF  - EP evaluation for plane and follow up    case d/w Dr. Martino

## 2023-05-05 NOTE — ED PROVIDER NOTE - PHYSICAL EXAMINATION
Constitutional: Awake, alert, in no acute distress  Eyes: no scleral icterus  HENT: normocephalic, atraumatic, moist oral mucosa  Neck: supple  CV: +irregularly irregular, no murmur, 2+ distal pulses in all extremities  Pulm: non-labored respirations, CTAB  Abdomen: soft, non-tender, non-distended  Extremities: no edema, no deformity  Skin: no rash, no jaundice  Neuro: AAOx3, moving all extremities equally

## 2023-05-05 NOTE — ED CDU PROVIDER INITIAL DAY NOTE - CLINICAL SUMMARY MEDICAL DECISION MAKING FREE TEXT BOX
53y M w/ no significant PMH presents for palpitations. Found to be in new onset Afib. Rate controlled at this time. Hemodynamically stable. No acute findings on initial workup. Cardiology consulted. Pt with CHADSVASC of 0. Remains in Afib despite IV/PO metoprolol. Placed in observation pending TTE, EP consult, possible cardioversion.

## 2023-05-05 NOTE — ED CDU PROVIDER DISPOSITION NOTE - CARE PROVIDER_API CALL
Carmen Radford)  Cardiac Electrophysiology; Cardiovascular Disease; Internal Medicine  [address] [place] [address]  Phone: [phone]  Fax: [phone]  Established Patient  Follow Up Time: Routine    Rashida Martino ()  Cardiology; Internal Medicine  [address] [place] [address]  Phone: [phone]  Fax: [phone]  Established Patient  Follow Up Time: 7-10 Days

## 2023-05-05 NOTE — ED CDU PROVIDER INITIAL DAY NOTE - NS ED ROS FT
Constitutional: no fever, no chills  Eyes: no vision changes  ENT: no nasal congestion, no sore throat  CV: no chest pain, +palpitations  Resp: no cough, no shortness of breath  GI: no abdominal pain, no vomiting, no diarrhea  : no dysuria  MSK: no joint pain  Skin: no rash  Neuro: no headache, no focal weakness, no paresthesias 100 negative - no chest pain

## 2023-05-16 ENCOUNTER — NON-APPOINTMENT (OUTPATIENT)
Age: 53
End: 2023-05-16

## 2023-05-16 ENCOUNTER — APPOINTMENT (OUTPATIENT)
Dept: CARDIOLOGY | Facility: CLINIC | Age: 53
End: 2023-05-16
Payer: COMMERCIAL

## 2023-05-16 VITALS
BODY MASS INDEX: 28.58 KG/M2 | OXYGEN SATURATION: 93 % | WEIGHT: 211 LBS | DIASTOLIC BLOOD PRESSURE: 60 MMHG | SYSTOLIC BLOOD PRESSURE: 96 MMHG | HEART RATE: 56 BPM | HEIGHT: 72 IN

## 2023-05-16 VITALS — DIASTOLIC BLOOD PRESSURE: 60 MMHG | SYSTOLIC BLOOD PRESSURE: 90 MMHG

## 2023-05-16 DIAGNOSIS — R93.1 ABNORMAL FINDINGS ON DIAGNOSTIC IMAGING OF HEART AND CORONARY CIRCULATION: ICD-10-CM

## 2023-05-16 DIAGNOSIS — Z09 ENCOUNTER FOR FOLLOW-UP EXAMINATION AFTER COMPLETED TREATMENT FOR CONDITIONS OTHER THAN MALIGNANT NEOPLASM: ICD-10-CM

## 2023-05-16 PROCEDURE — 93000 ELECTROCARDIOGRAM COMPLETE: CPT

## 2023-05-16 PROCEDURE — 99215 OFFICE O/P EST HI 40 MIN: CPT | Mod: 25

## 2023-05-25 ENCOUNTER — APPOINTMENT (OUTPATIENT)
Dept: CT IMAGING | Facility: CLINIC | Age: 53
End: 2023-05-25

## 2023-06-01 ENCOUNTER — APPOINTMENT (OUTPATIENT)
Dept: CT IMAGING | Facility: CLINIC | Age: 53
End: 2023-06-01
Payer: COMMERCIAL

## 2023-06-01 ENCOUNTER — OUTPATIENT (OUTPATIENT)
Dept: OUTPATIENT SERVICES | Facility: HOSPITAL | Age: 53
LOS: 1 days | End: 2023-06-01
Payer: COMMERCIAL

## 2023-06-01 DIAGNOSIS — Z98.89 OTHER SPECIFIED POSTPROCEDURAL STATES: Chronic | ICD-10-CM

## 2023-06-01 DIAGNOSIS — I48.91 UNSPECIFIED ATRIAL FIBRILLATION: ICD-10-CM

## 2023-06-01 DIAGNOSIS — Q82.5 CONGENITAL NON-NEOPLASTIC NEVUS: Chronic | ICD-10-CM

## 2023-06-01 PROCEDURE — 75574 CT ANGIO HRT W/3D IMAGE: CPT | Mod: 26

## 2023-06-01 PROCEDURE — 75574 CT ANGIO HRT W/3D IMAGE: CPT

## 2023-06-12 ENCOUNTER — APPOINTMENT (OUTPATIENT)
Dept: ELECTROPHYSIOLOGY | Facility: CLINIC | Age: 53
End: 2023-06-12
Payer: COMMERCIAL

## 2023-06-12 VITALS
BODY MASS INDEX: 27.63 KG/M2 | DIASTOLIC BLOOD PRESSURE: 80 MMHG | WEIGHT: 204 LBS | HEART RATE: 53 BPM | OXYGEN SATURATION: 96 % | HEIGHT: 72 IN | TEMPERATURE: 97.7 F | SYSTOLIC BLOOD PRESSURE: 120 MMHG

## 2023-06-12 PROCEDURE — 99215 OFFICE O/P EST HI 40 MIN: CPT | Mod: 25

## 2023-06-12 PROCEDURE — 93000 ELECTROCARDIOGRAM COMPLETE: CPT

## 2023-06-12 RX ORDER — APIXABAN 5 MG/1
5 TABLET, FILM COATED ORAL
Qty: 60 | Refills: 0 | Status: DISCONTINUED | COMMUNITY
Start: 1900-01-01 | End: 2023-06-12

## 2023-06-12 RX ORDER — ROSUVASTATIN CALCIUM 20 MG/1
20 TABLET, FILM COATED ORAL DAILY
Qty: 90 | Refills: 0 | Status: ACTIVE | COMMUNITY
Start: 2023-06-12 | End: 1900-01-01

## 2023-06-12 NOTE — PHYSICAL EXAM
[Well Developed] : well developed [Well Nourished] : well nourished [No Acute Distress] : no acute distress [Normal S1, S2] : normal S1, S2 [No Murmur] : no murmur [Clear Lung Fields] : clear lung fields [Good Air Entry] : good air entry [No Respiratory Distress] : no respiratory distress  [Normal Gait] : normal gait [No Edema] : no edema [Moves all extremities] : moves all extremities [No Focal Deficits] : no focal deficits [Alert and Oriented] : alert and oriented

## 2023-06-12 NOTE — HISTORY OF PRESENT ILLNESS
[FreeTextEntry1] : This is a healthy 54 y/o man with no prior PMH who presented with symptomatic AF with RVR on 5/4/23. He continued to be symptomatic despite rate control. He reported no clear trigger at the time. He underwent chemical cardioversion with 300 mg of flecainide. He was discharged on metoprolol, Eliquis and pill in the pocket flecainide. Underwent cardiac CT to screen for CAD. His echo was unremarkable at the hospital as well as his blood work\par \par He is here for follow up. Denies any symptomatic recurrence since hospital visit. Cardiac CT showed mild non-obstructive CAD. 2 weeks monitor showed no recurrent AF and only non-sustained AT which correlated with his symptoms. Denies any bleeding on Eliquis\par \par ROS: Rare snoring without apnea, morning headache or daytime sleepiness. \par \par Social history:\par Negative for smoking, illicit drugs or alcohol abuse.\par \par Family history:\par Negative for premature CAD or SCD. \par \par \par TESTS:\par EKG 6/12/23: SB @ 51 bpm. Early repolarization pattern\par \par TTE [facility] 5/2023:\par Summary:\par  1. Left ventricular ejection fraction, by visual estimation, is 55 to 60%.\par  2. Normal global left ventricular systolic function.\par  3. Basal and mid anterior wall and basal and mid anterior septum are \par abnormal as described above.\par  4. The mitral in-flow pattern reveals no discernable A-wave, therefore \par no comment on diastolic function can be made.\par  5. Mildly increased LV wall thickness.\par  6. There is mild concentric left ventricular hypertrophy.\par  7. Normal left atrial size.\par  8. Normal right atrial size.\par  9. Trivial tricuspid regurgitation is visualized. Adequate TR velocity \par was not obtained to accurately assess RVSP.\par 10. Mild mitral valve regurgitation.\par 11. Mild thickening of the anterior and posterior mitral valve leaflets.\par 12. Dilatation of the sinuses of Valsalva, measures 3.7 cm is at the upper limit of normal.\par 13. There is no evidence of pericardial effusion.\par 14. There are no prior studies on this patient for comparison purposes.\par \par Rashida Martino MD Electronically signed on 5/5/2023 at 12:40:24 PM\par \par HM 6/2023: No AF. nonsustained AT that correlated with symptoms. \par \par CTA 6/2023: Mild non-obstructive plaque. \par

## 2023-06-12 NOTE — ASSESSMENT
[FreeTextEntry1] : Patient with one symptomatic AF episode, s/p chemical cardioversion and pill in the pocket approach since last month. He had no recurrence and did not need to use flecainide. Has been tolerating current dose metoprolol and eliquis. Today, he reports excessive drinking for one week prior to the AF episode which could have been a trigger for his AF. Denies any symptomatic recurrent AF and 2 weeks monitor showed no AF. Today, we had lengthy discussion about AF nature, prognosis and management options. Discussed rate and rhythm control options and role of early interventions. Given possible trigger (excessive alcohol) for his single episode, we made a shared decision to monitor for now. Discussed monitoring options, and will plan to use one week HM every 6 months PLUS Apple watch or [employer]. Will continue to use pill in the pocket if needed which is safe despite his CTA findings. Will likely consider AF ablation if had recurrence. Discussed role of sleep apnea screening and he prefers to hold off on this given minimal symptoms\par \par \par Discussed CT findings and recommended to start 20 mg crestor and aspirin. Counseled about this. To be further managed by general cardiology.\par \par - Stop eliquis, CHADS-VASc = 1 for non-obstructive CAD\par - start aspirin 81 mg and crestor 20 mg for non-obstructive plaques on CT scan.\par - Follow up in 6 months with one week HM prior\par - To use Kardia and report to us with any suspected recurrent AF

## 2023-06-12 NOTE — REVIEW OF SYSTEMS
[Fever] : no fever [Chills] : no chills [Dyspnea on exertion] : not dyspnea during exertion [Palpitations] : no palpitations [Syncope] : no syncope [Cough] : no cough [Wheezing] : no wheezing

## 2023-07-03 ENCOUNTER — RX RENEWAL (OUTPATIENT)
Age: 53
End: 2023-07-03

## 2023-07-03 RX ORDER — METOPROLOL SUCCINATE 50 MG/1
50 TABLET, EXTENDED RELEASE ORAL DAILY
Qty: 90 | Refills: 2 | Status: ACTIVE | COMMUNITY
Start: 2023-07-03 | End: 1900-01-01

## 2023-08-22 ENCOUNTER — APPOINTMENT (OUTPATIENT)
Dept: CARDIOLOGY | Facility: CLINIC | Age: 53
End: 2023-08-22
Payer: COMMERCIAL

## 2023-08-22 VITALS
OXYGEN SATURATION: 95 % | BODY MASS INDEX: 26.41 KG/M2 | DIASTOLIC BLOOD PRESSURE: 60 MMHG | HEIGHT: 72 IN | HEART RATE: 54 BPM | WEIGHT: 195 LBS | SYSTOLIC BLOOD PRESSURE: 96 MMHG

## 2023-08-22 DIAGNOSIS — I25.10 ATHEROSCLEROTIC HEART DISEASE OF NATIVE CORONARY ARTERY W/OUT ANGINA PECTORIS: ICD-10-CM

## 2023-08-22 DIAGNOSIS — I10 ESSENTIAL (PRIMARY) HYPERTENSION: ICD-10-CM

## 2023-08-22 PROCEDURE — 93000 ELECTROCARDIOGRAM COMPLETE: CPT

## 2023-08-22 PROCEDURE — 99214 OFFICE O/P EST MOD 30 MIN: CPT | Mod: 25

## 2023-08-22 RX ORDER — FLECAINIDE ACETATE 150 MG/1
150 TABLET ORAL TWICE DAILY
Refills: 0 | Status: ACTIVE | COMMUNITY

## 2023-09-05 PROBLEM — I25.10 CAD IN NATIVE ARTERY: Status: ACTIVE | Noted: 2023-06-12

## 2023-09-05 NOTE — REASON FOR VISIT
[Symptom and Test Evaluation] : symptom and test evaluation [FreeTextEntry1] : 53 year old male, non smoker, with no significant PMHx presented to [facility] ED on 5/5/2023 with palpitations that began suddenly while watching TV the night prior to presentation. The pt was found to be in new onset AFib with RVR, CHADSVASC =1, was started on BB and converted to sinus rhythm with flecainide. TTE on 5/5 revealed LV EF 55-60% , basal and mid anterior wall and basal and mid anterior septum hypokinesis, mild cLVH normal RV, LA normal in size, mild MR. Troponin negative x 2. He was discharged on Toprol XL 50 mg daily with flecainide pill in pocket as per Dr Radford, on Eliquis 5 mg BID for at least 1 month , MCOT and CCTA done shows non obstructive cad   Patient notes that since last visit has been doing well with no complaints of chest pain or shortness of breath; he has since lost 15 pounds and notes that he has changed his diet and eating healthier.   Discussed that compliance with statin and on ASA; was on Eliquis and this was stopped by EP based on CHADS-VASC 1 and on Flecainide PRN

## 2023-09-05 NOTE — ASSESSMENT
[FreeTextEntry1] : 53 year old male, non smoker, with PMHx  AFib with RVR, CHADSVASC =1, not on AC, Non obstructive CAD and Dyslipidemia presents for follow up   1) pAF, self converted, CHADS-VASC 1 not on AC, on Flecainide  - in sinus rhythm with no complaints of any palpitations  - EKG reviewed  - on BB - Toprol XL 50mg po q daily and on Flecainde 150mg PRN  - on ASA 81mg po q daily   2) Non obstructive CAD  - no active cheat pain or shortness of breath  - on ASA and Statin   3) Dyslipidemia  - on Crestor 20mg po q daily   Rashida Martino D.O. [facility]/Vascular Cardiology -[facility] Telephone # [phone]

## 2023-09-05 NOTE — CARDIOLOGY SUMMARY
[de-identified] : 8/22/2023: Sinus Bradycardia @ 54 bpm WITHIN NORMAL LIMITS 5/16/2023 Sinus bradycardia 55 WNL   [de-identified] :  05/05/2023 TTE at [facility] LV EF 55-60% , basal and mid anterior wall and basal and mid anterior septum are hypokinetic, mild cLVH, normal RV, LA normal in size, mild MR, dilatation of the sinuses of valsalva measures 3.7 cm is at the upper limit of normal [de-identified] : Cardiac CTA : calculated Agatston score is 110 Mild luminal narrowing of the second obtuse marginal division. 2.  Mild luminal narrowing of the proximal left anterior descending coronary artery.

## 2024-01-08 ENCOUNTER — NON-APPOINTMENT (OUTPATIENT)
Age: 54
End: 2024-01-08

## 2024-01-08 ENCOUNTER — APPOINTMENT (OUTPATIENT)
Dept: ELECTROPHYSIOLOGY | Facility: CLINIC | Age: 54
End: 2024-01-08
Payer: COMMERCIAL

## 2024-01-08 VITALS
WEIGHT: 214 LBS | HEART RATE: 64 BPM | HEIGHT: 72 IN | BODY MASS INDEX: 28.99 KG/M2 | OXYGEN SATURATION: 98 % | SYSTOLIC BLOOD PRESSURE: 122 MMHG | DIASTOLIC BLOOD PRESSURE: 76 MMHG

## 2024-01-08 PROCEDURE — 93000 ELECTROCARDIOGRAM COMPLETE: CPT

## 2024-01-08 PROCEDURE — 99214 OFFICE O/P EST MOD 30 MIN: CPT | Mod: 25

## 2024-01-08 NOTE — REVIEW OF SYSTEMS
[SOB] : no shortness of breath [Dyspnea on exertion] : not dyspnea during exertion [Chest Discomfort] : no chest discomfort [Palpitations] : no palpitations [Syncope] : no syncope [Dizziness] : no dizziness

## 2024-01-08 NOTE — HISTORY OF PRESENT ILLNESS
[FreeTextEntry1] : This is a healthy 54 y/o man with no prior PMH who presented with symptomatic AF with RVR on 5/4/23. He continued to be symptomatic despite rate control. He reported no clear trigger at the time. He underwent chemical cardioversion with 300 mg of flecainide. He was discharged on metoprolol, Eliquis and pill in the pocket flecainide. Underwent cardiac CT to screen for CAD. His echo was unremarkable at the hospital as well as his blood work  Cardiac CT showed mild non-obstructive CAD. 2 weeks monitor showed no recurrent AF and only non-sustained AT which correlated with his symptoms. Denies any bleeding on Eliquis. Echo revealed EF 55-60%. During f/u he admitted to excessive ETOH in take the week leading up to the AF episode. Given [id number] 1 , Eliquis was discontinued. Home surveillance with [employer] nila recommended.   Holter worn 5/16/23-5/28/23 revealed SR . No AF/AFL. Brief SVT up to 8 beats in duration.   Today, Mr. Mccoy reports he has been feeling well since last follow up. Denies symptomatic arrhythmia recurrence. Tolerating medical therapy with Toprol, denies any known side effects.

## 2024-01-08 NOTE — DISCUSSION/SUMMARY
[FreeTextEntry1] : This is a healthy 54 y/o man with no prior PMH who presented with symptomatic AF with RVR on 5/4/23. He continued to be symptomatic despite rate control. He reported no clear trigger at the time. He underwent chemical cardioversion with 300 mg of flecainide. He was discharged on metoprolol, Eliquis and pill in the pocket flecainide. Underwent cardiac CT to screen for CAD. His echo was unremarkable at the hospital as well as his blood work  Cardiac CT showed mild non-obstructive CAD. 2 weeks monitor showed no recurrent AF and only non-sustained AT which correlated with his symptoms. Denies any bleeding on Eliquis. Echo revealed EF 55-60%. During f/u he admitted to excessive ETOH in take the week leading up to the AF episode. Given [id number] 1 , Eliquis was discontinued. Home surveillance with [employer] nila recommended.   Holter worn 5/16/23-5/28/23 revealed SR . No AF/AFL. Brief SVT up to 8 beats in duration.   Today, Mr. Mccoy reports he has been feeling well since last follow up. Denies symptomatic arrhythmia recurrence. Tolerating medical therapy with Toprol, denies any known side effects.   Recommendation:   Today, we discussed management of AF. Denies symptomatic recurrence since last follow up. Continue with ongoing monitoring and pill in pocket flecainide if needed (Has not utilized to date). Continue toprol 50mg daily. Off AC as [id number] 1. IF AF returns we discussed benefits of early rhythm control with possible ablation. To return for EP follow up in 6 months with 1 week holter prior.   Candi Reed   [EKG obtained to assist in diagnosis and management of assessed problem(s)] : EKG obtained to assist in diagnosis and management of assessed problem(s)

## 2024-01-23 ENCOUNTER — APPOINTMENT (OUTPATIENT)
Dept: CARDIOLOGY | Facility: CLINIC | Age: 54
End: 2024-01-23

## 2024-06-12 ENCOUNTER — APPOINTMENT (OUTPATIENT)
Dept: ELECTROPHYSIOLOGY | Facility: CLINIC | Age: 54
End: 2024-06-12
Payer: COMMERCIAL

## 2024-06-12 ENCOUNTER — NON-APPOINTMENT (OUTPATIENT)
Age: 54
End: 2024-06-12

## 2024-06-12 VITALS
BODY MASS INDEX: 27.5 KG/M2 | SYSTOLIC BLOOD PRESSURE: 102 MMHG | HEART RATE: 77 BPM | HEIGHT: 72 IN | OXYGEN SATURATION: 98 % | DIASTOLIC BLOOD PRESSURE: 70 MMHG | WEIGHT: 203 LBS

## 2024-06-12 DIAGNOSIS — I48.91 UNSPECIFIED ATRIAL FIBRILLATION: ICD-10-CM

## 2024-06-12 PROCEDURE — 93000 ELECTROCARDIOGRAM COMPLETE: CPT

## 2024-06-12 PROCEDURE — 99204 OFFICE O/P NEW MOD 45 MIN: CPT | Mod: 25

## 2024-06-12 NOTE — DISCUSSION/SUMMARY
[FreeTextEntry1] : In summary, the patient is a 54-year-old male with a history of HTN, HLD and atrial fibrillation. He has experienced only one AF episode in the past with no recurrences since. Outpatient monitoring has been unremarkable except for brief non-sustained AT. He is not on AC (CHADASVASC score 1). He is currently on metoprolol 50 mg daily. I recommended that he reduce the dose to 25 mg daily. He is planning to buy an [employer] Watch or [employer] for AF monitoring. He will return for follow up in 6 months or earlier if needed.  [EKG obtained to assist in diagnosis and management of assessed problem(s)] : EKG obtained to assist in diagnosis and management of assessed problem(s)

## 2024-06-12 NOTE — HISTORY OF PRESENT ILLNESS
[FreeTextEntry1] : The patient is a 54-year-old male presenting for follow up today (previously with Dr. Radford). The patient has a history of HTN and atrial fibrillation.   To summarize, the patient first presented with symptomatic AF with RVR on 5/4/23. He continued to be symptomatic despite rate control. He reported no clear trigger at the time. He underwent chemical cardioversion with 300 mg of flecainide. He was discharged on metoprolol, Eliquis and pill in the pocket flecainide. He underwent cardiac CT to screen for CAD. His echo was unremarkable at the hospital as well as his blood work. Cardiac CT showed mild non-obstructive CAD. Two weeks monitor showed no recurrent AF and only non-sustained AT which correlated with his symptoms. Denies any bleeding on Eliquis. Echo revealed EF 55-60%. During f/u he admitted to excessive ETOH in take the week leading up to the AF episode. Given [id number] 1 Eliquis was discontinued. Home surveillance with [employer] nila recommended. Holter worn 5/16/23-5/28/23 revealed SR. No AF/AFL. Brief SVT up to 8 beats in duration. He is here for follow up today. Repeat monitoring was performed earlier this month. Again, no significant arrhythmias were seen other than brief AT (4-5 beats). The patient reports to be doing well. The patient denies any symptoms of palpitations, shortness of breath, dizziness, chest pain, syncope or extremity edema. He drinks alcohol only socially and is trying to lose weight. He is currently on metoprolol 50 mg daily.

## 2024-12-18 ENCOUNTER — APPOINTMENT (OUTPATIENT)
Dept: ELECTROPHYSIOLOGY | Facility: CLINIC | Age: 54
End: 2024-12-18
Payer: COMMERCIAL

## 2024-12-18 VITALS
DIASTOLIC BLOOD PRESSURE: 64 MMHG | SYSTOLIC BLOOD PRESSURE: 110 MMHG | BODY MASS INDEX: 27.5 KG/M2 | HEART RATE: 71 BPM | OXYGEN SATURATION: 99 % | HEIGHT: 72 IN | WEIGHT: 203 LBS

## 2024-12-18 DIAGNOSIS — I48.91 UNSPECIFIED ATRIAL FIBRILLATION: ICD-10-CM

## 2024-12-18 PROCEDURE — 99214 OFFICE O/P EST MOD 30 MIN: CPT | Mod: 25

## 2024-12-18 PROCEDURE — 93000 ELECTROCARDIOGRAM COMPLETE: CPT

## 2025-04-30 ENCOUNTER — RX RENEWAL (OUTPATIENT)
Age: 55
End: 2025-04-30

## 2025-05-01 ENCOUNTER — APPOINTMENT (OUTPATIENT)
Dept: CARDIOLOGY | Facility: CLINIC | Age: 55
End: 2025-05-01